# Patient Record
Sex: MALE
[De-identification: names, ages, dates, MRNs, and addresses within clinical notes are randomized per-mention and may not be internally consistent; named-entity substitution may affect disease eponyms.]

---

## 2019-11-26 PROBLEM — Z00.00 ENCOUNTER FOR PREVENTIVE HEALTH EXAMINATION: Status: ACTIVE | Noted: 2019-11-26

## 2019-12-18 ENCOUNTER — APPOINTMENT (OUTPATIENT)
Dept: SPEECH THERAPY | Facility: CLINIC | Age: 39
End: 2019-12-18

## 2020-01-22 ENCOUNTER — OUTPATIENT (OUTPATIENT)
Dept: OUTPATIENT SERVICES | Facility: HOSPITAL | Age: 40
LOS: 1 days | Discharge: HOME | End: 2020-01-22

## 2020-01-22 ENCOUNTER — APPOINTMENT (OUTPATIENT)
Dept: SPEECH THERAPY | Facility: CLINIC | Age: 40
End: 2020-01-22

## 2020-02-04 NOTE — ASSESSMENT
[FreeTextEntry1] : Speech/Language Evaluation and Report\par \par Date of Evaluation: 2020\par Patient Name: Priscilla Jensen\par : 76	C.A.: 40 years old\par Hand dominance: right\par \par Primary Diagnosis: basal ganglia CVA\par Treatment Diagnosis: Dysarthria\par Referring Physician: Dr. Romaine Mistry\par Date of Onset: 2000\par Pain: presence of pain was not reported\par \par Procedure Codes: Evaluation of speech sound production with language – 33582\par \par REASON FOR REFERRAL:\par Mr. [facility] by Dr. Mistry for an evaluation to assess speech and language functioning.  Patient was accompanied by his father, his home health aide (HHA), and his , who all assisted in providing relevant information/history during today’s assessment.  His  and father report difficulty with communication following a craniofacial surgery the complicated hospitalization. \par \par PRESENTING PROBLEM:\par Mr. Jensen, his , and father arrived to today’s session with concerns regarding his speech/language function. 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  Post-operative course was complicated by an anoxic brain injury and basal ganglia stroke.  Father reports patient had a trach and PEG placed but both were removed during his course of therapy at [facility].  According to Speech-Language evaluation from [facility] on 2019, the patient received therapy at [facility] from  to , the father confirmed but could not report what the therapy addressed as he reports there was no changes in verbal ability to communicate at that time.  The evaluation from [facility] on 2019 reported severe spastic dysarthria “which does not allow for functional communication” and recommended an audiology evaluation, a Fiberoptic Endoscopic Evaluation of Swallowing with Sensory Testing (FEEST) and an AAC evaluation.  Patient had a FEEST 19 which recommended NPO, however patient continues to eat and does not have a PEG, the father and the  report they understand the results of the evaluation.  Patient had an audiological evaluation which revealed normal hearing bilaterally.  The  reported that the patient has been going to the [facility], and is being seen there for therapy to address Mr. Jensen’s ability to communicate with an AAC device.  Father reports patient holds up one finger to indicate “yes” and makes a zero symbol with his hand to indicate “no.”  Father and HHA report they occasionally setup iPad for patient to use to communicate wants and needs.  The  reported that [facility] is also working on getting a wheelchair mount for the iPad to encourage use.  \par \par MEDICAL HISTORY:\par Mr. 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  Post-operative course was complicated by an anoxic brain injury and basal ganglia stroke.  Father reports patient had a trach and PEG placed but both were removed during his course of therapy at [facility].  \par \par \par ORAL PERIPHERAL EXAM:\par Structures: difficulty with maintaining eye open position, patient holds eyelids up most of the time\par Symmetry: right-sided facial weakness \par Dentition: Complete \par Soft Palate: reduced coordination\par Secretions: mild drooling \par Voice: WFL\par Hearing: report of difficulty hearing, does not have hearing aids\par Functions: assessment of the labio-lingual musculature revealed reduced range of motion, coordination and strength.  Patient presented with a lot of extraneous and not purposeful movements when at rest. \par \par SPEECHVOICE:\par SLP provided subtests of the [place]ay Dysarthria Assessment to assess speech motor function.  Mr. Jensen presented with a severe motor speech impairment was observed, characterized by reduced ability to approximate vowels and no visible approximation for consonants, poor control of the labial and lingual articulators, severe reduction in rate, reduced control overt pitch and volume.  He presents with severely impaired intelligibility at the syllable level. \par \par LANGUAGE:\par Receptive: \par Mr. Jensen was administered subtests from the Burn’s Left Hemisphere Inventory and Pride Diagnostic Aphasia Examination in order to formally assess receptive language functioning. He responded to simple yes/no questions with 100% accuracy, completed picture identification tasks of common objects with 100% accuracy, completed body part identification tasks with 90% accuracy, followed two-step commands with 80% accuracy.  Mr. Jensen intermittently required repetition of instructions to bring focus back to the task. More complex receptive language tasks were not administered as they required a lengthy verbal response which he was unable to provide.  \par   \par Expressive:\par Mr. Jensen was administered subtests from the Burn’s Left Hemisphere Inventory and Pride Diagnostic Aphasia Examination in order to formally assess expressive language functioning. He produced automatic speech tasks with 10% accuracy, verbal repetition tasks of single words with 30% accuracy/attempted.  While using the iPad to communicate, Mr. Jensen completed responsive naming tasks with 100% accuracy and sentence completion tasks with 80% accuracy.  When given the [employer] Photo, he demonstrated a lot of starting and revising, but he was to provide a fair description of the actions in the picture.  Mr. Jensen’s sentence structure was within functional limits.\par \par Reading:\par Mr. Jensen completed reading comprehension of words and matching them to pictures with 100% accuracy.  He completed reading comprehension of functional paragraphs with 66% accuracy.\par \par Writing:\par Mr. Jensen is unable to write due to physical impairments.  \par \par Augmentative Alternative Communication:\par Mr. Jensen utilized the Speech Assistant iris on his iPad to communicate during some expressive language tasks.  He was able to navigate the device independently, self-correct when appropriate, however he did need increased time to read through word prediction choices, and navigate the keyboard.  \par \par IMPRESSIONS:\par 1. Severe spastic dysarthria \par 2. Mild to moderate receptive and expressive aphasia.\par \par PROGNOSIS:\par Guarded for speech therapy given severity and time since onset.  Patient appears to be a good candidate for therapy addressing AAC, patient is already receiving this type of therapy at the [facility].  \par \par RECOMMENDATIONS:\par 1. Continue speech-language therapy at the [facility] addressing AAC.\par 2. Follow up with physician as directed.\par \par EDUCATION:\par Verbal educational information and instruction was provided to Mr. Jensen, his father and his  regarding the above recommendations.\par \par Today's results and recommendations were discussed and agreed upon with the patient. Please contact the [facility] at [facility] at [phone] if additional information is needed.

## 2020-02-05 DIAGNOSIS — I69.322 DYSARTHRIA FOLLOWING CEREBRAL INFARCTION: ICD-10-CM

## 2020-07-29 ENCOUNTER — APPOINTMENT (OUTPATIENT)
Dept: SPEECH THERAPY | Facility: CLINIC | Age: 40
End: 2020-07-29